# Patient Record
(demographics unavailable — no encounter records)

---

## 2020-09-08 NOTE — EDM.PDOC
ED HPI GENERAL MEDICAL PROBLEM





- General


Chief Complaint: General


Stated Complaint: TAILBONE COMPLAINT


Time Seen by Provider: 09/08/20 11:01


Source of Information: Reports: Patient


History Limitations: Reports: No Limitations





- History of Present Illness


INITIAL COMMENTS - FREE TEXT/NARRATIVE: 





68-year-old female presents to the ED for evaluation of gradually worsening 

coccyx pain.  Patient has been sitting on a crates and cannot sit on any firm 

surface since about 23 August.  No known falls or injuries.  She has no known 

underlying malignancy in any of her bones.  Second problem is been constipation 

alternating with some diarrhea due to stool softeners that she is taking.  She 

is here primarily due to constant pain in the coccyx area.


Onset: Sudden


Onset Date: 08/23/20


Duration: Day(s):, Getting Worse


Location: Reports: Pelvis (Pain primarily in the superior portion of the coccyx 

at the sacrococcygeal junction.)


Quality: Reports: Ache, Throbbing


Severity: Moderate


Improves with: Reports: Rest, Other


Worsens with: Reports: Other (Doing on a grades to take the weight off the 

area.)


Context: Denies: Activity, Lifting, Sick Contact, Trauma, Other


Associated Symptoms: Denies: No Other Symptoms, Confusion, Chest Pain, cough w 

sputum, Diaphoresis, Fever/Chills, Headaches, Loss of Appetite, Malaise, 

Shortness of Breath, Syncope


Treatments PTA: Reports: Acetaminophen (3000 mg a day.)


  ** Sacral


Pain Score (Numeric/FACES): 10





- Related Data


                                    Allergies











Allergy/AdvReac Type Severity Reaction Status Date / Time


 


amoxicillin [From Augmentin] Allergy Severe Cannot Verified 09/08/20 10:49





   Remember  


 


clavulanic acid Allergy Severe Cannot Verified 09/08/20 10:49





[From Augmentin]   Remember  


 


honey Allergy  Respiratory Verified 09/08/20 10:49





   Distress  


 


nut - unspecified Allergy  Cannot Verified 09/08/20 10:49





   Remember  


 


pollen extracts Allergy  Cannot Verified 09/08/20 10:49





   Remember  











Home Meds: 


                                    Home Meds





Calcium Carbonate [Calcium] 1,000 mg PO DAILY 09/08/20 [History]


Cholecalciferol (Vitamin D3) [Vitamin D3] 3,000 unit PO DAILY 09/08/20 [History]


Meloxicam 15 mg PO DAILY #14 tablet 09/08/20 [Rx]


Multivit with Calcium,Iron,Min [One Daily Women's] 1 each PO DAILY 09/08/20 

[History]


polyethylene glycoL 3350 [MiraLAX] 17 gm PO DAILY #1 cont 09/08/20 [Rx]


predniSONE [Prednisone] 20 mg PO ASDIRECTED #15 tablet 09/08/20 [Rx]











Past Medical History


Cardiovascular History: Reports: None


Respiratory History: Reports: Asthma


OB/GYN History: Reports: Ectopic Pregnancy, Other (See Below)


Other OB/GYN History: hysteroscopic myomectomy


Musculoskeletal History: Reports: Osteoporosis


Other Musculoskeletal History: wrist surgery


Neurological History: Reports: None


Psychiatric History: Reports: None


Endocrine/Metabolic History: Reports: None


Hematologic History: Reports: None


Immunologic History: Reports: None


Oncologic (Cancer) History: Reports: None


Dermatologic History: Reports: None





- Infectious Disease History


Infectious Disease History: Reports: None





- Past Surgical History


HEENT Surgical History: Reports: Naso-Sinus Surgery


Respiratory Surgical History: Reports: None


GI Surgical History: Reports: Appendectomy, Colonoscopy


Female  Surgical History: Reports: Other (See Below)


Endocrine Surgical History: Reports: None


Neurological Surgical History: Reports: Other (See Below)


Other Neurological Surgeries/Procedures: Reconstruction of the nerves in the 

right arm.


Musculoskeletal Surgical History: Reports: Other (See Below)


Other Musculoskeletal Surgeries/Procedures:: Wrist Surgery


Oncologic Surgical History: Reports: None





Social & Family History





- Tobacco Use


Smoking Status *Q: Never Smoker





- Caffeine Use


Caffeine Use: Reports: Coffee





- Recreational Drug Use


Recreational Drug Use: No





- Living Situation & Occupation


Living situation: Reports: 


Occupation: Retired





ED ROS GENERAL





- Review of Systems


Review Of Systems: See Below


Constitutional: Denies: Fever, Chills, Malaise, Weakness, Decreased Appetite, 

Weight Loss


HEENT: Reports: Glasses


Respiratory: Reports: No Symptoms


Cardiovascular: Reports: No Symptoms


Endocrine: Reports: No Symptoms


GI/Abdominal: Reports: Constipation, Diarrhea (Diarrhea usually ensues if she 

takes a stool softener.)


: Reports: Frequency, Other (Has chronic microscopic hematuria but that has 

been assessed by neurologist without any positive findings on cystoscopy of the 

bladder.)


Musculoskeletal: Reports: Other (Chronic pain for the last 3 weeks in her)


Skin: Reports: No Symptoms ( coccyx and lower sacrum.  No known injuries.)


Neurological: Reports: No Symptoms


Psychiatric: Reports: No Symptoms


Hematologic/Lymphatic: Reports: No Symptoms





ED EXAM, GENERAL





- Physical Exam


Exam: See Below


Exam Limited By: Uncooperative


General Appearance: Alert, WD/WN, No Apparent Distress, Other (Temperature is 

36.2.  Heart rate 65 and sinus respiratory 16 with O2 sats of 98% on room air.  

Blood pressure is mildly elevated 136 106.)


Eye Exam: Bilateral Eye: Normal Inspection (No scleral icterus or blepharal 

pallor.), PERRL


Respiratory/Chest: No Respiratory Distress, Lungs Clear, Normal Breath Sounds, 

No Accessory Muscle Use


Cardiovascular: Normal Peripheral Pulses, Regular Rate, Rhythm, No Gallop, No 

Murmur, No Rub


Peripheral Pulses: 2+: Posterior Tibial (L), Posterior Tibial (R), Dorsalis 

Pedis (L), Dorsalis Pedis (R)


GI/Abdominal: Soft, Non-Tender, No Organomegaly, No Abnormal Bruit, No Mass, 

Pelvis Stable, Abnormal Bowel Sounds (Bowel sounds are mildly hyperactive in all

 4 quadrants.), Other.  No: Guarding, Rigid, Rebound, Tender


Back Exam: Other (Pain well localized to the junction between the coccyx and the

 sacral area to touch.  Obvious deformities or masses appreciated.)


Extremities: Normal Inspection, Normal Range of Motion, Non-Tender, No Pedal 

Edema, Normal Capillary Refill


Neurological: Alert, Oriented, CN II-XII Intact, Normal Cognition


Psychiatric: Normal Affect, Normal Mood


Skin Exam: Warm, Dry, Intact, Normal Color, No Rash





Course





- Vital Signs


Last Recorded V/S: 


                                Last Vital Signs











Temp  36.2 C   09/08/20 10:45


 


Pulse  65   09/08/20 12:45


 


Resp  16   09/08/20 12:45


 


BP  112/63   09/08/20 12:45


 


Pulse Ox  95   09/08/20 12:45














- Orders/Labs/Meds


Labs: 


                                Laboratory Tests











  09/08/20 09/08/20 09/08/20 Range/Units





  11:26 11:26 11:26 


 


WBC  6.50    (3.98-10.04)  K/mm3


 


RBC  4.22    (3.98-5.22)  M/mm3


 


Hgb  13.2  D    (11.2-15.7)  gm/dl


 


Hct  41.2    (34.1-44.9)  %


 


MCV  97.6 H    (79.4-94.8)  fl


 


MCH  31.3    (25.6-32.2)  pg


 


MCHC  32.0 L    (32.2-35.5)  g/dl


 


RDW Std Deviation  45.9    (36.4-46.3)  fL


 


Plt Count  166 L    (182-369)  K/mm3


 


MPV  8.3 L    (9.4-12.3)  fl


 


Neut % (Auto)  46.8    (34.0-71.1)  %


 


Lymph % (Auto)  44.9    (19.3-51.7)  %


 


Mono % (Auto)  6.2    (4.7-12.5)  %


 


Eos % (Auto)  1.4    (0.7-5.8)  


 


Baso % (Auto)  0.5    (0.1-1.2)  %


 


Neut # (Auto)  3.05    (1.56-6.13)  K/mm3


 


Lymph # (Auto)  2.92    (1.18-3.74)  K/mm3


 


Mono # (Auto)  0.40 H    (0.24-0.36)  K/mm3


 


Eos # (Auto)  0.09    (0.04-0.36)  K/mm3


 


Baso # (Auto)  0.03    (0.01-0.08)  K/mm3


 


ESR    12  (0-20)  mm/hr


 


Sodium   139   (136-145)  mEq/L


 


Potassium   4.8   (3.5-5.1)  mEq/L


 


Chloride   104   ()  mEq/L


 


Carbon Dioxide   30   (21-32)  mEq/L


 


Anion Gap   9.8   (5-15)  


 


BUN   19 H   (7-18)  mg/dL


 


Creatinine   0.8   (0.55-1.02)  mg/dL


 


Est Cr Clr Drug Dosing   58.12   mL/min


 


Estimated GFR (MDRD)   > 60   (>60)  mL/min


 


BUN/Creatinine Ratio   23.8 H   (14-18)  


 


Glucose   101   ()  mg/dL


 


Calcium   9.2   (8.5-10.1)  mg/dL


 


Total Bilirubin   0.2   (0.2-1.0)  mg/dL


 


AST   22   (15-37)  U/L


 


ALT   38   (14-59)  U/L


 


Alkaline Phosphatase   53   ()  U/L


 


C-Reactive Protein   0.6   (<1.0)  mg/dL


 


Total Protein   7.0   (6.4-8.2)  g/dl


 


Albumin   3.4   (3.4-5.0)  g/dl


 


Globulin   3.6   gm/dL


 


Albumin/Globulin Ratio   0.9 L   (1-2)  














- Radiology Interpretation


Free Text/Narrative:: 


68-year-old female presents to the ED primarily due to pain at the 

sacrococcygeal junction.  No known injuries.  First noticed it while sitting in 

[place] on 23 August.  Pain is been constant over 6 since.  She is been sitting 

on a creates to dissipate the weight.  Second problem is been intermittent 

problems with constipation which aggravates the coccygeal pain.  She gets 

diarrhea however if she takes any stool softeners.  Plan she will have a KUB of 

her abdomen performed to look for bowel gas pattern and stool.  She will have CT

 of her pelvis including the coccyx and sacrum.








- Re-Assessments/Exams


Free Text/Narrative Re-Assessment/Exam: 





09/08/20 13:06 x-ray of the abdomen shows of scattered stool throughout the 

colon particularly right hemicolon and rectal vault.  No bowel obstruction 

evident.  CT of the pelvis reveals vacuum disc phenomena within the L4-5 and L 

L5-S1 levels mild degenerative changes seen within the apophyseal joints in this

 area.  Sacrum and coccyx shows no fracture.  Scoliosis is partially visualized 

within the lumbar spine.  No fractures are identified and no evidence of 

osteomyelitis evident.





09/08/20 13:13 I have  discussed the findings with the patient.  Decision will 

be to place her on MiraLAX powder 17 g once daily to try and regulate her bowels

 so that she is not going in between diarrhea and constipation.  Most of her 

problems have been constipation and there is a large stool bolus pushing down on

 the sacrum and coccyx area at this time.  Secondly I am going to place her on 

meloxicam 15 mg once daily for the next 2 weeks to see if this will alleviate a 

good deal of her inflammation as she has vacuum phenomena at the L4-L5 and L5-S1

 disc spaces which is likely the cause of the referred pain to the coccyx and 

sacral area.  I am also going to place her on prednisone 20 mg twice daily 

breakfast and supper for 5 days and then once in the morning only for another 5 

days in the hopes of further relieving her inflammation.  Plan will be to 

follow-up with her primary care provider Bianca Rivas in 12 days time to see how

 she is making out.








Departure





- Departure


Time of Disposition: 13:14


Disposition: Home, Self-Care 01


Condition: Fair


Clinical Impression: 


 Coccyalgia, Constipation by delayed colonic transit








- Discharge Information


*PRESCRIPTION DRUG MONITORING PROGRAM REVIEWED*: Not Applicable


*COPY OF PRESCRIPTION DRUG MONITORING REPORT IN PATIENT BRIAN: Not Applicable


Prescriptions: 


Meloxicam 15 mg PO DAILY #14 tablet


polyethylene glycoL 3350 [MiraLAX] 17 gm PO DAILY #1 cont


predniSONE [Prednisone] 20 mg PO ASDIRECTED #15 tablet


Instructions:  Tailbone Injury, Easy-to-Read


Referrals: 


Bianca Rivas, NP [Primary Care Provider] - 


Forms:  ED Department Discharge


Additional Instructions: 


Evaluation in the emergency room today in regards to persistent and worsening 

pain felt primarily in the coccyx and lower sacrum area of your lower back.  No 

known injuries or falls to irritate this area.  Second problem is intermittent 

problems with constipation and diarrhea if you take stool softeners.  X-ray of 

the abdomen does show increased stool throughout the right hemicolon and into 

the rectal vault.  I.e. mild to moderate constipation due to poor transit 

through the colon.  Suggest taking MiraLAX powder 17 g or 1 scoop every single 

day for the next 3 to 4 weeks to regulate the bowel and prevent constipation 

from occurring.  CT scan done of your pelvis reveals degenerative change at the 

lumbar 4-5 level and left lumbar 5-S1 level which is the lowest 2 bones in your 

lower back.  They have loss of the disc completely in this area and therefore I 

think your current low back pain is referred down into the sacrum and coccyx 

area causing constant pain.  Suggest a trial of anti-inflammatory meloxicam 15 

mg once daily for the next 14 days to try and reduce pain and inflammation.  

Also a short course of prednisone 20 mg with breakfast and supper for 5 days and

then once in the morning only for another 5 days to further help reduce pain and

inflammation.  I would like you to follow-up with your primary care provider 

Bianca Rivas in approximately 12 to 14 days time to see how you are making out. 

Pain is worsening instead of getting better further imaging such as MRI or bone 

scan will need to be carried out.





Sepsis Event Note (ED)





- Evaluation


Sepsis Screening Result: No Definite Risk





- Focused Exam


Vital Signs: 


                                   Vital Signs











  Temp Pulse Resp BP Pulse Ox


 


 09/08/20 12:45   65  16  112/63  95


 


 09/08/20 10:45  36.2 C  65  16  136/106 H  98

## 2020-09-08 NOTE — CT
CT pelvis

 

Technique: Multiple axial sections through the pelvis were obtained.  

Reconstructed coronal and sagittal images were reviewed.

 

Comparison: Previous sacrum and coccyx plain film study of 08/26/20.

 

Findings: Vacuum disc phenomena is seen within the L4-5 and L5-S1 

disks.  Mild degenerative change is seen within the apophyseal joints 

at both these levels.  Sacrum and coccyx shows no fracture.  Scoliosis

 is partially visualized within the lumbar spine.

 

Impression:

1.  Degenerative change within the lumbar spine.

2.  No acute abnormality is appreciated on CT study of this pelvis.

 

Diagnostic code #2

 

This report was dictated in MDT

## 2020-09-08 NOTE — CR
Abdomen: Supine view of the abdomen was obtained.

 

Comparison: Prior abdominal x-ray of 08/26/20.

 

Scoliosis and degenerative changes noted within the spine.  Bowel gas 

pattern is normal.  No abnormal calcifications or soft tissue 

abnormality is seen.

 

Impression:

1.  Findings as noted above.  Nothing acute is seen on supine 

abdominal x-ray.

 

Diagnostic code #2

 

This report was dictated in MDT